# Patient Record
Sex: MALE | Race: OTHER | HISPANIC OR LATINO | URBAN - METROPOLITAN AREA
[De-identification: names, ages, dates, MRNs, and addresses within clinical notes are randomized per-mention and may not be internally consistent; named-entity substitution may affect disease eponyms.]

---

## 2020-05-26 ENCOUNTER — EMERGENCY (EMERGENCY)
Facility: HOSPITAL | Age: 45
LOS: 1 days | Discharge: HOME | End: 2020-05-26
Attending: EMERGENCY MEDICINE | Admitting: EMERGENCY MEDICINE
Payer: OTHER MISCELLANEOUS

## 2020-05-26 VITALS
WEIGHT: 165.35 LBS | OXYGEN SATURATION: 99 % | TEMPERATURE: 98 F | SYSTOLIC BLOOD PRESSURE: 142 MMHG | DIASTOLIC BLOOD PRESSURE: 86 MMHG | RESPIRATION RATE: 16 BRPM | HEIGHT: 65 IN | HEART RATE: 66 BPM
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
## 2020-05-26 PROCEDURE — 73110 X-RAY EXAM OF WRIST: CPT | Mod: 26,LT

## 2020-05-26 PROCEDURE — 99284 EMERGENCY DEPT VISIT MOD MDM: CPT

## 2020-05-26 PROCEDURE — 72100 X-RAY EXAM L-S SPINE 2/3 VWS: CPT | Mod: 26

## 2020-05-26 PROCEDURE — 72100 X-RAY EXAM L-S SPINE 2/3 VWS: CPT

## 2020-05-26 PROCEDURE — 73110 X-RAY EXAM OF WRIST: CPT

## 2020-05-26 PROCEDURE — 99283 EMERGENCY DEPT VISIT LOW MDM: CPT

## 2020-05-26 RX ORDER — BACITRACIN ZINC 500 UNIT/G
1 OINTMENT IN PACKET (EA) TOPICAL ONCE
Refills: 0 | Status: COMPLETED | OUTPATIENT
Start: 2020-05-26 | End: 2020-05-26

## 2020-05-26 RX ADMIN — Medication 1 APPLICATION(S): at 13:15

## 2020-05-26 NOTE — ED PROVIDER NOTE - OBJECTIVE STATEMENT
43 y/o male with no PMHx is present with lower back pain since 10 am today. As pt was walking down the stairs, he tripped and fell down on his rear approx 4 steps. Pt reports pain the lower b/l back without radiation. He was ambulatory immediately after. In addition pt reports pain located to the left wrist and an abrasion to the right elbow. He denies self-treatement for pain. He denies the following: LOC, neck pain, HA, chest/abdominal pain, sob, loss urine/bm, saddle anesthesia, numbness/tingling to extremities.

## 2020-05-26 NOTE — ED PROVIDER NOTE - CLINICAL SUMMARY MEDICAL DECISION MAKING FREE TEXT BOX
45 y/o male here with LBP s/p mechanical fall. Neuro intact. Neg spinal tenderness and no step offs. No ecchymosis noted. Pain medication offered, however pt declined. The patient has xrays that are negative for acute fracture or dislocation; however, I did discuss with the patient the possibility of an occult or hairline fracture that is not immediately apparent on initial xray and that the patient will need follow up xrays within a week if pain persists; the patient does understand this.  The patient also has normal distal m/s/s and pulses; NVID; no ev of compartment syndrome or limb ischemia or neurovasc/nerve damage, and the patient was made aware of ssx that would be concerning for this and need for immediate return to ER. I have discussed the discharge plan with the patient. The patient agrees with the plan, as discussed.  The patient understands Emergency Department diagnosis is a preliminary diagnosis often based on limited information and that the patient must adhere to the follow-up plan as discussed.  The patient understands that if the symptoms worsen or if prescribed medications do not have the desired/planned effect that the patient may return to the Emergency Department at any time for further evaluation and treatment.

## 2020-05-26 NOTE — ED PROVIDER NOTE - ATTENDING CONTRIBUTION TO CARE
44M mechanical trip down stairs this AM complaining of bl lower back, L wrist pain, abrasion R elbow.   No head injury, loc or neck pain,  no injury to abd or thorax.  No numbness, tingling or loss of continence.  Nl ambulation.  No midline ttp.  Paraspinal lumbar ttp, no signs of back trauma.  Nl neuro exam.  FROM all extremities, looks very well, ambulatory.  Plan xray lumbar spine and L wrist and reassess.  DO not suspect major traumatic injury.

## 2020-05-26 NOTE — ED PROVIDER NOTE - PATIENT PORTAL LINK FT
You can access the FollowMyHealth Patient Portal offered by [facility] by registering at the following website: [email or web address]. By joining [employer]’s FollowMyHealth portal, you will also be able to view your health information using other applications (apps) compatible with our system.

## 2020-05-26 NOTE — ED ADULT TRIAGE NOTE - CHIEF COMPLAINT QUOTE
Pt BIBA from workplace s/p fall and CO back pain since 1000.  Pt states "I slipped down the last 4 steps and landed on my back."  Pt denies head injury, loc, dizziness, N/V/D, SOB, Fevers and CP.

## 2020-05-26 NOTE — ED ADULT NURSE NOTE - NSIMPLEMENTINTERV_GEN_ALL_ED
Implemented All Fall Risk Interventions:  [place] to call system. Call bell, personal items and telephone within reach. Instruct patient to call for assistance. Room bathroom lighting operational. Non-slip footwear when patient is off stretcher. Physically safe environment: no spills, clutter or unnecessary equipment. Stretcher in lowest position, wheels locked, appropriate side rails in place. Provide visual cue, wrist band, yellow gown, etc. Monitor gait and stability. Monitor for mental status changes and reorient to person, place, and time. Review medications for side effects contributing to fall risk. Reinforce activity limits and safety measures with patient and family.

## 2020-05-26 NOTE — ED ADULT NURSE NOTE - CHPI ED NUR SYMPTOMS NEG
no confusion/no bleeding/no deformity/no abrasion/no fever/no vomiting/no numbness/no weakness/no tingling/no loss of consciousness

## 2020-05-26 NOTE — ED PROVIDER NOTE - NSFOLLOWUPINSTRUCTIONS_ED_ALL_ED_FT
Please rest, take motrin for pain if it occurs and follow up with your PMD. Return to the Emergency Department if you have any new or worsening symptoms, or if you have any concerns.    Acute Low Back Pain    WHAT YOU NEED TO KNOW:    Acute low back pain is sudden discomfort in your lower back area that lasts for up to 6 weeks. The discomfort makes it difficult to tolerate activity.     DISCHARGE INSTRUCTIONS:    Return to the emergency department if:     You have severe pain.      You have sudden stiffness and heaviness on both buttocks down to both legs.      You have numbness or weakness in one leg, or pain in both legs.      You have numbness in your genital area or across your lower back.      You cannot control your urine or bowel movements.    Contact your healthcare provider if:     You have a fever.      You have pain at night or when you rest.      Your pain does not get better with treatment.      You have pain that worsens when you cough or sneeze.      You suddenly feel something pop or snap in your back.      You have questions or concerns about your condition or care.    Medicines: You may need any of the following:     NSAIDs help decrease swelling and pain. This medicine is available with or without a doctor's order. NSAIDs can cause stomach bleeding or kidney problems in certain people. If you take blood thinner medicine, always ask your healthcare provider if NSAIDs are safe for you. Always read the medicine label and follow directions.      Acetaminophen decreases pain and fever. It is available without a doctor's order. Ask how much to take and how often to take it. Follow directions. Read the labels of all other medicines you are using to see if they also contain acetaminophen, or ask your doctor or pharmacist. Acetaminophen can cause liver damage if not taken correctly. Do not use more than 4 grams (4,000 milligrams) total of acetaminophen in one day.       Muscle relaxers decrease pain by relaxing the muscles in your lower spine.      Prescription pain medicine may be given. Ask your healthcare provider how to take this medicine safely. Some prescription pain medicines contain acetaminophen. Do not take other medicines that contain acetaminophen without talking to your healthcare provider. Too much acetaminophen may cause liver damage. Prescription pain medicine may cause constipation. Ask your healthcare provider how to prevent or treat constipation.     Self-care:     Stay active as much as you can without causing more pain. Bed rest could make your back pain worse. Start with some light exercises, such as walking. Avoid heavy lifting until your pain is gone. Ask for more information about the activities or exercises that are right for you.       Apply ice on your back for 15 to 20 minutes every hour or as directed. Use an ice pack, or put crushed ice in a plastic bag. Cover it with a towel before you apply it to your skin. Ice helps prevent tissue damage and decreases swelling and pain.       Apply heat on your back for 20 to 30 minutes every 2 hours for as many days as directed. Heat helps decrease pain and muscle spasms. Alternate heat and ice.    Prevent acute low back pain:     Use proper body mechanics.   Bend at the hips and knees when you  objects. Do not bend from the waist. Use your leg muscles as you lift the load. Do not use your back. Keep the object close to your chest as you lift it. Try not to twist or lift anything above your waist.      Change your position often when you stand for long periods of time. Rest one foot on a small box or footrest, and then switch to the other foot often.      Try not to sit for long periods of time. When you do, sit in a straight-backed chair with your feet flat on the floor. Never reach, pull, or push while you are sitting.      Do exercises that strengthen your back muscles. Warm up before you exercise. Ask your healthcare provider the best exercises for you.      Maintain a healthy weight. Ask your healthcare provider how much you should weigh. Ask him or her to help you create a weight loss plan if you are overweight.    Follow up with your healthcare provider as directed: Return for a follow-up visit if you still have pain after 1 to 3 weeks of treatment. You may need to visit an orthopedist if your back pain lasts longer than 12 weeks. Write down your questions so you remember to ask them during your visits.       © Copyright [employer] 2020       back to top                      © Copyright [employer] 2020

## 2020-05-26 NOTE — ED PROVIDER NOTE - DIAGNOSTIC INTERPRETATION
Xray lumbar: no acute fracture, no soft tissue swelling noted, normal bony alignment  Xray wrist, left: no acute fracture, no soft tissue swelling noted, normal bony alignment

## 2020-05-30 DIAGNOSIS — S60.512A ABRASION OF LEFT HAND, INITIAL ENCOUNTER: ICD-10-CM

## 2020-05-30 DIAGNOSIS — M25.532 PAIN IN LEFT WRIST: ICD-10-CM

## 2020-05-30 DIAGNOSIS — M54.5 LOW BACK PAIN: ICD-10-CM
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
